# Patient Record
Sex: MALE | Race: BLACK OR AFRICAN AMERICAN | NOT HISPANIC OR LATINO | Employment: FULL TIME | URBAN - METROPOLITAN AREA
[De-identification: names, ages, dates, MRNs, and addresses within clinical notes are randomized per-mention and may not be internally consistent; named-entity substitution may affect disease eponyms.]

---
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
## 2023-01-17 ENCOUNTER — OFFICE VISIT (OUTPATIENT)
Dept: URGENT CARE | Facility: CLINIC | Age: 18
End: 2023-01-17
Payer: MEDICAID

## 2023-01-17 VITALS
BODY MASS INDEX: 21.81 KG/M2 | RESPIRATION RATE: 18 BRPM | HEIGHT: 70 IN | WEIGHT: 152.31 LBS | HEART RATE: 102 BPM | OXYGEN SATURATION: 100 % | TEMPERATURE: 100 F | SYSTOLIC BLOOD PRESSURE: 121 MMHG | DIASTOLIC BLOOD PRESSURE: 83 MMHG

## 2023-01-17 DIAGNOSIS — R68.89 FLU-LIKE SYMPTOMS: ICD-10-CM

## 2023-01-17 DIAGNOSIS — J06.9 UPPER RESPIRATORY TRACT INFECTION, UNSPECIFIED TYPE: Primary | ICD-10-CM

## 2023-01-17 DIAGNOSIS — J02.9 SORE THROAT: ICD-10-CM

## 2023-01-17 LAB
CTP QC/QA: YES
CTP QC/QA: YES
FLUAV AG NPH QL: NEGATIVE
FLUBV AG NPH QL: NEGATIVE
SARS-COV-2 RDRP RESP QL NAA+PROBE: NEGATIVE

## 2023-01-17 PROCEDURE — 87804 INFLUENZA ASSAY W/OPTIC: CPT | Mod: PBBFAC | Performed by: NURSE PRACTITIONER

## 2023-01-17 PROCEDURE — 99213 OFFICE O/P EST LOW 20 MIN: CPT | Mod: PBBFAC | Performed by: NURSE PRACTITIONER

## 2023-01-17 PROCEDURE — 99213 OFFICE O/P EST LOW 20 MIN: CPT | Mod: S$PBB,,, | Performed by: NURSE PRACTITIONER

## 2023-01-17 PROCEDURE — 87081 CULTURE SCREEN ONLY: CPT | Performed by: NURSE PRACTITIONER

## 2023-01-17 PROCEDURE — 99213 PR OFFICE/OUTPT VISIT, EST, LEVL III, 20-29 MIN: ICD-10-PCS | Mod: S$PBB,,, | Performed by: NURSE PRACTITIONER

## 2023-01-17 PROCEDURE — 87635 SARS-COV-2 COVID-19 AMP PRB: CPT | Mod: PBBFAC | Performed by: NURSE PRACTITIONER

## 2023-01-17 NOTE — PATIENT INSTRUCTIONS
- OTC cold/flu products as desired for symptoms  - Plenty of fluids  - Home from work/school  - Tylenol or Motrin for pain/fever  - Flu/COVID tests NEGATIVE  - Strep test pending     See patient education for guidance.    We will notify you if your Strep test is positive.  If you need antibiotics, we will automatically send them to your pharmacy and notify you that we did that.    Otherwise, you just have a viral infection causing a sore throat that will get better on it's own. Take tylenol and motrin as you need it. Cold water, fluids.

## 2023-01-17 NOTE — PROGRESS NOTES
"Subjective:       Patient ID: Yandy Mandel is a 17 y.o. male.    Vitals:  height is 5' 10.08" (1.78 m) and weight is 69.1 kg (152 lb 4.8 oz). His temperature is 99.5 °F (37.5 °C). His blood pressure is 121/83 and his pulse is 102. His respiration is 18 and oxygen saturation is 100%.     Chief Complaint: Generalized Body Aches (Body aches, HA, sore throat, congestion x 2 days.)    HPI as stated in CC. Works as a hospital/patient transporter.  ROS    Objective:      Physical Exam   Constitutional: He is oriented to person, place, and time. He does not appear ill. normal  HENT:   Nose: Rhinorrhea and congestion present.   Mouth/Throat: Posterior oropharyngeal erythema present.      Comments: Exudate vs stone left tonsil, 1.  Eyes: Conjunctivae are normal.   Pulmonary/Chest: Effort normal and breath sounds normal.   Abdominal: Normal appearance.   Neurological: He is alert and oriented to person, place, and time.   Skin: Skin is warm and dry.   Psychiatric: His behavior is normal. Mood, judgment and thought content normal.   Vitals reviewed.      Assessment:       1. Upper respiratory tract infection, unspecified type    2. Flu-like symptoms    3. Sore throat          Results for orders placed or performed in visit on 01/17/23   POCT COVID-19 Rapid Screening   Result Value Ref Range    POC Rapid COVID Negative Negative     Acceptable Yes    POCT Influenza A/B   Result Value Ref Range    Rapid Influenza A Ag Negative Negative    Rapid Influenza B Ag Negative Negative     Acceptable Yes          Plan:         Upper respiratory tract infection, unspecified type  -     POCT COVID-19 Rapid Screening    Flu-like symptoms  -     POCT COVID-19 Rapid Screening  -     POCT Influenza A/B  -     Strep Only Culture    Sore throat  -     POCT COVID-19 Rapid Screening  -     POCT Influenza A/B  -     Strep Only Culture            - OTC cold/flu products as desired for symptoms  - Plenty of fluids  - Home " from work/school  - Tylenol or Motrin for pain/fever  - Flu/COVID tests NEGATIVE  - Strep test pending     See patient education for guidance.    We will notify you if your Strep test is positive.  If you need antibiotics, we will automatically send them to your pharmacy and notify you that we did that.    Otherwise, you just have a viral infection causing a sore throat that will get better on it's own. Take tylenol and motrin as you need it. Cold water, fluids.

## 2023-01-17 NOTE — LETTER
January 17, 2023      [facility]  [facility] [facility] [address]  Phone: [phone]       Patient: Yandy Mandel   YOB: 2005  Date of Visit: 01/17/2023    To Whom It May Concern:    Aditi Mandel  was at [facility] on 01/17/2023. The patient may return to work/school on 1/19/23. If you have any questions or concerns, or if I can be of further assistance, please do not hesitate to contact me.    Sincerely,    DANNY Vasquez NP

## 2023-01-19 LAB — BACTERIA THROAT CULT: NORMAL
